# Patient Record
Sex: FEMALE | Race: BLACK OR AFRICAN AMERICAN | NOT HISPANIC OR LATINO | Employment: FULL TIME | URBAN - METROPOLITAN AREA
[De-identification: names, ages, dates, MRNs, and addresses within clinical notes are randomized per-mention and may not be internally consistent; named-entity substitution may affect disease eponyms.]

---

## 2017-03-21 ENCOUNTER — OFFICE VISIT (OUTPATIENT)
Dept: FAMILY MEDICINE | Facility: CLINIC | Age: 32
End: 2017-03-21
Payer: COMMERCIAL

## 2017-03-21 ENCOUNTER — PATIENT MESSAGE (OUTPATIENT)
Dept: FAMILY MEDICINE | Facility: CLINIC | Age: 32
End: 2017-03-21

## 2017-03-21 VITALS
DIASTOLIC BLOOD PRESSURE: 71 MMHG | BODY MASS INDEX: 41.49 KG/M2 | HEART RATE: 89 BPM | HEIGHT: 62 IN | TEMPERATURE: 99 F | SYSTOLIC BLOOD PRESSURE: 123 MMHG | WEIGHT: 225.5 LBS

## 2017-03-21 DIAGNOSIS — J30.2 SEASONAL ALLERGIC RHINITIS, UNSPECIFIED ALLERGIC RHINITIS TRIGGER: Primary | ICD-10-CM

## 2017-03-21 DIAGNOSIS — J02.9 SORE THROAT: ICD-10-CM

## 2017-03-21 LAB — DEPRECATED S PYO AG THROAT QL EIA: NEGATIVE

## 2017-03-21 PROCEDURE — 87880 STREP A ASSAY W/OPTIC: CPT | Mod: PO

## 2017-03-21 PROCEDURE — 1160F RVW MEDS BY RX/DR IN RCRD: CPT | Mod: S$GLB,,, | Performed by: FAMILY MEDICINE

## 2017-03-21 PROCEDURE — 87081 CULTURE SCREEN ONLY: CPT

## 2017-03-21 PROCEDURE — 99999 PR PBB SHADOW E&M-EST. PATIENT-LVL III: CPT | Mod: PBBFAC,,, | Performed by: FAMILY MEDICINE

## 2017-03-21 PROCEDURE — 99213 OFFICE O/P EST LOW 20 MIN: CPT | Mod: S$GLB,,, | Performed by: FAMILY MEDICINE

## 2017-03-21 RX ORDER — CETIRIZINE HYDROCHLORIDE 10 MG/1
10 TABLET ORAL DAILY
COMMUNITY

## 2017-03-21 RX ORDER — FLUTICASONE PROPIONATE 50 MCG
SPRAY, SUSPENSION (ML) NASAL
Qty: 16 G | Refills: 11 | Status: SHIPPED | OUTPATIENT
Start: 2017-03-21

## 2017-03-21 NOTE — PROGRESS NOTES
"Patient complains of sore throat, mild postnasal drainage, no significant cough, no fever.  Symptoms started over the past few days.  Current treatment over-the-counter medication.  Possible sick contacts.  States seasonal ALLERGY symptoms    Past Medical History:  Past Medical History:   Diagnosis Date    Gastric bypass status for obesity     Obesity (BMI 35.0-39.9 without comorbidity)      Past Surgical History:   Procedure Laterality Date    GASTRIC BYPASS  2011     Review of patient's allergies indicates:   Allergen Reactions    Ansaid [flurbiprofen]      Current Outpatient Prescriptions on File Prior to Visit   Medication Sig Dispense Refill    ergocalciferol (ERGOCALCIFEROL) 50,000 unit Cap Take 1 capsule (50,000 Units total) by mouth every 7 days. 4 capsule 0     No current facility-administered medications on file prior to visit.      Social History     Social History    Marital status: Single     Spouse name: N/A    Number of children: N/A    Years of education: N/A     Occupational History    Not on file.     Social History Main Topics    Smoking status: Never Smoker    Smokeless tobacco: Not on file    Alcohol use No    Drug use: Not on file    Sexual activity: Not on file     Other Topics Concern    Not on file     Social History Narrative    No narrative on file     Family History   Problem Relation Age of Onset    Diabetes Paternal Grandmother     Rheum arthritis Mother              Physical Exam:  Vitals:    03/21/17 1519   BP: 123/71   Pulse: 89   Temp: 98.6 °F (37 °C)   Weight: 102.3 kg (225 lb 8.5 oz)   Height: 5' 2" (1.575 m)       Gen.: No acute distress  HEENT: sinuses nontender. Ears: Tympanic membranes intact.  No erythema or effusion.  Nose mild congestion.  Throat mild erythema no exudate.  Mild postnasal drainage.  Neck supple no adenopathy  Chest: Clear to auscultation.  Normal respiratory effort.    Alda was seen today for sore throat and otalgia.    Diagnoses and " all orders for this visit:    Seasonal allergic rhinitis, unspecified allergic rhinitis trigger    Sore throat  -     Throat Screen, Rapid    Other orders  -     fluticasone (FLONASE) 50 mcg/actuation nasal spray; Use 2 sprays to each nostril daily  -     Strep A culture, throat     Tylenol and salt water gargle  She may continue Zyrtec    Follow-up as needed if symptoms not improving with Recommended treatment next few days

## 2017-03-21 NOTE — MR AVS SNAPSHOT
[facility]  [address] [address] [place] [address]  Phone: [phone]  Fax: [phone]                  Alda Cooper   3/21/2017 3:20 PM   Office Visit    Description:  Female : 1985   Provider:  Alan Huff MD   Department:  [facility]           Reason for Visit     Sore Throat     Otalgia           Diagnoses this Visit        Comments    Seasonal allergic rhinitis, unspecified allergic rhinitis trigger    -  Primary     Sore throat                To Do List           Goals (5 Years of Data)     None       These Medications        Disp Refills Start End    fluticasone (FLONASE) 50 mcg/actuation nasal spray 16 g 11 3/21/2017     Use 2 sprays to each nostril daily    Pharmacy: [facility] [address] [place] [address] AT [address] [address]) Ph #: [phone]         [facility] On Call     [facility] On Call Nurse Care Line -  Assistance  Registered nurses in the [facility] On Call Center provide clinical advisement, health education, appointment booking, and other advisory services.  Call for this free service at [phone].             Medications           Message regarding Medications     Verify the changes and/or additions to your medication regime listed below are the same as discussed with your clinician today.  If any of these changes or additions are incorrect, please notify your healthcare provider.        START taking these NEW medications        Refills    fluticasone (FLONASE) 50 mcg/actuation nasal spray 11    Sig: Use 2 sprays to each nostril daily    Class: Normal           Verify that the below list of medications is an accurate representation of the medications you are currently taking.  If none reported, the list may be blank. If incorrect, please contact your healthcare provider. Carry this list with you in case of emergency.           Current Medications     cetirizine (ZYRTEC) 10 MG tablet Take 10 mg by  "mouth once daily.    ergocalciferol (ERGOCALCIFEROL) 50,000 unit Cap Take 1 capsule (50,000 Units total) by mouth every 7 days.    fluticasone (FLONASE) 50 mcg/actuation nasal spray Use 2 sprays to each nostril daily           Clinical Reference Information           Your Vitals Were     BP Pulse Temp Height Weight BMI    123/71 89 98.6 °F (37 °C) 5' 2" (1.575 m) 102.3 kg (225 lb 8.5 oz) 41.25 kg/m2      Blood Pressure          Most Recent Value    BP  123/71      Allergies as of 3/21/2017     Ansaid [Flurbiprofen]      Immunizations Administered on Date of Encounter - 3/21/2017     None      Orders Placed During Today's Visit      Normal Orders This Visit    Throat Screen, Rapid       Language Assistance Services     ATTENTION: Language assistance services are available, free of charge. Please call [phone].      ATENCIÓN: Si habla petrosañol, tiene a javed disposición servicios gratuitos de asistencia lingüística. Llame al [phone].     REMA Ý: N?u b?n nói Ti?ng Vi?t, có các d?ch v? h? tr? ngôn ng? mi?n phí dành cho b?n. G?i s? [phone].         [facility] complies with applicable Federal civil rights laws and does not discriminate on the basis of race, color, national origin, age, disability, or sex.        "

## 2017-03-23 LAB — BACTERIA THROAT CULT: NORMAL

## 2017-03-27 RX ORDER — ERGOCALCIFEROL 1.25 MG/1
50000 CAPSULE ORAL
Qty: 4 CAPSULE | Refills: 0 | Status: SHIPPED | OUTPATIENT
Start: 2017-03-27